# Patient Record
Sex: FEMALE | Race: WHITE | NOT HISPANIC OR LATINO | Employment: STUDENT | URBAN - METROPOLITAN AREA
[De-identification: names, ages, dates, MRNs, and addresses within clinical notes are randomized per-mention and may not be internally consistent; named-entity substitution may affect disease eponyms.]

---

## 2020-02-19 ENCOUNTER — OFFICE VISIT (OUTPATIENT)
Dept: ENDOCRINOLOGY | Facility: CLINIC | Age: 25
End: 2020-02-19

## 2020-02-19 VITALS
OXYGEN SATURATION: 99 % | SYSTOLIC BLOOD PRESSURE: 100 MMHG | WEIGHT: 119.2 LBS | HEART RATE: 76 BPM | DIASTOLIC BLOOD PRESSURE: 60 MMHG

## 2020-02-19 DIAGNOSIS — L65.9 HAIR LOSS: ICD-10-CM

## 2020-02-19 DIAGNOSIS — E03.9 ACQUIRED HYPOTHYROIDISM: Primary | ICD-10-CM

## 2020-02-19 DIAGNOSIS — E01.0 THYROMEGALY: ICD-10-CM

## 2020-02-19 PROCEDURE — 99204 OFFICE O/P NEW MOD 45 MIN: CPT | Performed by: PHYSICIAN ASSISTANT

## 2020-02-19 RX ORDER — ATENOLOL 25 MG/1
25 TABLET ORAL DAILY
COMMUNITY
End: 2020-04-28

## 2020-02-19 RX ORDER — NORETHINDRONE ACETATE AND ETHINYL ESTRADIOL, AND FERROUS FUMARATE 1MG-20(24)
KIT ORAL
COMMUNITY

## 2020-02-19 NOTE — PROGRESS NOTES
"  Chief Complaint:   Samantha Kaufman is a 24 y.o. female who is being seen today for abnormal thyroid function tests.     HPI     23 yo fairly healthy female comes in for further evaluation of abnormal thyroid function tests.   She reports first menses at age 13 but for the past year periods have been more painful and heavier and for the past 2 months has experiences spotting in between periods.   She c/o about 20# unintentional weight loss over the last year. She did experience some nausea and poor appetite last summer but that has improved. She continues to lose weight. She denies current n/v, abdominal pain, dizziness, low bp, sx of hypoglycemia.   She lived in [place] for about 5 months about 18 months ago and noticed increased hair loss and brittle hair. This has improved some but not much. She has tried different shampoos.  She c/o her scalp itches. Went to derm and was given a shampoo and \"drops\" to use but didn't help so she stopped using it. Took b vitamins but didn't notice an improvement in hair.   She also c/o chronic palpitations, anxiety.  Has acne.     Diagnosed: 1/2020  Medication: none  Previous medication: none  Hx of radiation to head/neck: no  Family hx of thyroid cancer: no    Labs reviewed:  1/9/2020 - TSH 5.13, H/H wnl, DHEA 202.4 wnl, cortisol at 2:42pm 10.7 wnl, FSH 7, prolactin 13  8/2019- TSH 2.7    The following portions of the patient's history were reviewed and updated as appropriate: allergies, current medications, past family history, past medical history, past social history, past surgical history and problem list.    Review of Systems  Review of Systems   Constitutional: Positive for unexpected weight change (weight loss).   Cardiovascular: Positive for palpitations.   Genitourinary: Positive for menstrual problem.   Skin:        Hair loss/brittle hair   All other systems reviewed and are negative.       Current medications:  Current Outpatient Medications   Medication Sig Dispense " Refill   • atenolol (TENORMIN) 25 MG tablet Take 25 mg by mouth Daily.     • Norethin Ace-Eth Estrad-FE (TAYTULLA) 1-20 MG-MCG(24) capsule Take  by mouth.       No current facility-administered medications for this visit.        Physical Exam   Vitals:    02/19/20 1430   BP: 100/60   Pulse: 76   SpO2: 99%   There is no height or weight on file to calculate BMI.  Physical Exam   Constitutional: She is oriented to person, place, and time. She appears well-developed. No distress.   HENT:   Head: Normocephalic.   Right Ear: External ear normal.   Left Ear: External ear normal.   Mouth/Throat: No oropharyngeal exudate.   Eyes: Conjunctivae and lids are normal. Right eye exhibits no discharge. Left eye exhibits no discharge. Right pupil is reactive. Left pupil is reactive.   Neck: No JVD present. No tracheal deviation present. Thyromegaly present. No thyroid mass present.   Cardiovascular: Normal rate, regular rhythm, normal heart sounds and intact distal pulses.   No murmur heard.  Pulmonary/Chest: Effort normal and breath sounds normal. No respiratory distress. She has no wheezes.   Abdominal: Soft. Bowel sounds are normal. There is no tenderness.   Musculoskeletal: She exhibits no edema or tenderness.   Lymphadenopathy:     She has no cervical adenopathy.   Neurological: She is alert and oriented to person, place, and time.   Skin: Skin is warm, dry and intact. No rash noted. She is not diaphoretic. No erythema.   Psychiatric: She has a normal mood and affect. Her speech is normal and behavior is normal. Thought content normal.       Labs and Imaging   No results found for: TSH, B2KCTZT, V1BIQTG, THYROIDAB    Assessment / Plan     Samantha was seen today for establish care.    Diagnoses and all orders for this visit:    Acquired hypothyroidism  -     TSH  -     T4, Free  -     Thyroid Antibodies  -     Vitamin D 25 Hydroxy    Hair loss  -     Ferritin    Thyromegaly  -     US Thyroid; Future        Problem List Items  Addressed This Visit        Endocrine    Acquired hypothyroidism - Primary    Relevant Medications    atenolol (TENORMIN) 25 MG tablet    Other Relevant Orders    TSH    T4, Free    Thyroid Antibodies    Vitamin D 25 Hydroxy      Other Visit Diagnoses     Hair loss        Relevant Orders    Ferritin    Thyromegaly        Relevant Medications    atenolol (TENORMIN) 25 MG tablet    Other Relevant Orders    US Thyroid        Diagnosis was discussed and reviewed with the patient including the advantages of drug therapy.        1. Patient appears clinically hypothyroid. Will repeat TFTs and add thyroid antibodies before treating. Add ferritin as she does have hair loss. Thyroid u/s for thyromegaly on exam. Her weight loss is unexplained- other than weight loss she does not exhibit other symptoms of adrenal insufficiency and a random cortisol was normal last month.       We have discussed in details the nature of the thyroid disease, thyroid hormone action, optimal TSH goals of 0.5-3.5, method of administration of levothyroxine and medication interactions.  I recommended taking the medication on an empty stomach in the morning or at bedtime, at least 30 minutes prior to intake of food or hot drinks and 4 hours apart from calcium or iron supplements.  2. Patient will return to our office in 2 months   3. The risks and benefits of my recommendations, as well as other treatment options were discussed with the patient today. Questions were answered.       Kiet Ly PA-C

## 2020-02-19 NOTE — PATIENT INSTRUCTIONS
Hypothyroidism    Hypothyroidism is when the thyroid gland does not make enough of certain hormones (it is underactive). The thyroid gland is a small gland located in the lower front part of the neck, just in front of the windpipe (trachea). This gland makes hormones that help control how the body uses food for energy (metabolism) as well as how the heart and brain function. These hormones also play a role in keeping your bones strong. When the thyroid is underactive, it produces too little of the hormones thyroxine (T4) and triiodothyronine (T3).  What are the causes?  This condition may be caused by:  · Hashimoto's disease. This is a disease in which the body's disease-fighting system (immune system) attacks the thyroid gland. This is the most common cause.  · Viral infections.  · Pregnancy.  · Certain medicines.  · Birth defects.  · Past radiation treatments to the head or neck for cancer.  · Past treatment with radioactive iodine.  · Past exposure to radiation in the environment.  · Past surgical removal of part or all of the thyroid.  · Problems with a gland in the center of the brain (pituitary gland).  · Lack of enough iodine in the diet.  What increases the risk?  You are more likely to develop this condition if:  · You are female.  · You have a family history of thyroid conditions.  · You use a medicine called lithium.  · You take medicines that affect the immune system (immunosuppressants).  What are the signs or symptoms?  Symptoms of this condition include:  · Feeling as though you have no energy (lethargy).  · Not being able to tolerate cold.  · Weight gain that is not explained by a change in diet or exercise habits.  · Lack of appetite.  · Dry skin.  · Coarse hair.  · Menstrual irregularity.  · Slowing of thought processes.  · Constipation.  · Sadness or depression.  How is this diagnosed?  This condition may be diagnosed based on:  · Your symptoms, your medical history, and a physical exam.  · Blood  tests.  You may also have imaging tests, such as an ultrasound or MRI.  How is this treated?  This condition is treated with medicine that replaces the thyroid hormones that your body does not make. After you begin treatment, it may take several weeks for symptoms to go away.  Follow these instructions at home:  · Take over-the-counter and prescription medicines only as told by your health care provider.  · If you start taking any new medicines, tell your health care provider.  · Keep all follow-up visits as told by your health care provider. This is important.  ? As your condition improves, your dosage of thyroid hormone medicine may change.  ? You will need to have blood tests regularly so that your health care provider can monitor your condition.  Contact a health care provider if:  · Your symptoms do not get better with treatment.  · You are taking thyroid replacement medicine and you:  ? Sweat a lot.  ? Have tremors.  ? Feel anxious.  ? Lose weight rapidly.  ? Cannot tolerate heat.  ? Have emotional swings.  ? Have diarrhea.  ? Feel weak.  Get help right away if you have:  · Chest pain.  · An irregular heartbeat.  · A rapid heartbeat.  · Difficulty breathing.  Summary  · Hypothyroidism is when the thyroid gland does not make enough of certain hormones (it is underactive).  · When the thyroid is underactive, it produces too little of the hormones thyroxine (T4) and triiodothyronine (T3).  · The most common cause is Hashimoto's disease, a disease in which the body's disease-fighting system (immune system) attacks the thyroid gland. The condition can also be caused by viral infections, medicine, pregnancy, or past radiation treatment to the head or neck.  · Symptoms may include weight gain, dry skin, constipation, feeling as though you do not have energy, and not being able to tolerate cold.  · This condition is treated with medicine to replace the thyroid hormones that your body does not make.  This information  is not intended to replace advice given to you by your health care provider. Make sure you discuss any questions you have with your health care provider.  Document Released: 12/18/2006 Document Revised: 11/28/2018 Document Reviewed: 11/28/2018  [employer] Interactive Patient Education © 2020 [employer]

## 2020-02-20 ENCOUNTER — TELEPHONE (OUTPATIENT)
Dept: ENDOCRINOLOGY | Facility: CLINIC | Age: 25
End: 2020-02-20

## 2020-02-20 LAB
25(OH)D3 SERPL-MCNC: 38.9 NG/ML (ref 30–100)
FERRITIN SERPL-MCNC: 19.8 NG/ML (ref 13–150)
T4 FREE SERPL-MCNC: 1.06 NG/DL (ref 0.93–1.7)
TSH SERPL DL<=0.05 MIU/L-ACNC: 4.28 UIU/ML (ref 0.27–4.2)

## 2020-02-20 PROCEDURE — 86376 MICROSOMAL ANTIBODY EACH: CPT | Performed by: PHYSICIAN ASSISTANT

## 2020-02-20 PROCEDURE — 82728 ASSAY OF FERRITIN: CPT | Performed by: PHYSICIAN ASSISTANT

## 2020-02-20 PROCEDURE — 86800 THYROGLOBULIN ANTIBODY: CPT | Performed by: PHYSICIAN ASSISTANT

## 2020-02-20 PROCEDURE — 84443 ASSAY THYROID STIM HORMONE: CPT | Performed by: PHYSICIAN ASSISTANT

## 2020-02-20 PROCEDURE — 82306 VITAMIN D 25 HYDROXY: CPT | Performed by: PHYSICIAN ASSISTANT

## 2020-02-20 PROCEDURE — 84439 ASSAY OF FREE THYROXINE: CPT | Performed by: PHYSICIAN ASSISTANT

## 2020-02-20 NOTE — TELEPHONE ENCOUNTER
Patient states that she was expecting to have an Rx sent to her pharmacy for her thyroid after her visit yesterday.     Call back [phone]

## 2020-02-20 NOTE — TELEPHONE ENCOUNTER
Left message on voicemail letting her know that Kiet wanted to wait until she got her lab results to send in the medication. We should have them back by Monday, I will call her about the results then.

## 2020-02-24 DIAGNOSIS — E03.9 ACQUIRED HYPOTHYROIDISM: Primary | ICD-10-CM

## 2020-02-24 LAB
THYROGLOB AB SERPL-ACNC: <1 IU/ML (ref 0–0.9)
THYROPEROXIDASE AB SERPL-ACNC: 8 IU/ML (ref 0–34)

## 2020-02-24 RX ORDER — LEVOTHYROXINE SODIUM 0.03 MG/1
25 TABLET ORAL DAILY
Qty: 90 TABLET | Refills: 1 | Status: SHIPPED | OUTPATIENT
Start: 2020-02-24 | End: 2020-04-28 | Stop reason: SDUPTHER

## 2020-02-25 ENCOUNTER — HOSPITAL ENCOUNTER (OUTPATIENT)
Dept: ULTRASOUND IMAGING | Facility: HOSPITAL | Age: 25
Discharge: HOME | End: 2020-02-25
Admitting: PHYSICIAN ASSISTANT

## 2020-02-25 ENCOUNTER — TELEPHONE (OUTPATIENT)
Dept: ENDOCRINOLOGY | Facility: CLINIC | Age: 25
End: 2020-02-25

## 2020-02-25 DIAGNOSIS — E01.0 THYROMEGALY: ICD-10-CM

## 2020-02-25 PROCEDURE — 76536 US EXAM OF HEAD AND NECK: CPT

## 2020-03-05 ENCOUNTER — TELEPHONE (OUTPATIENT)
Dept: ENDOCRINOLOGY | Facility: CLINIC | Age: 25
End: 2020-03-05

## 2020-03-06 NOTE — TELEPHONE ENCOUNTER
Spoke with patient, she must have gotten an old message because I had spoke with her on 2/25 and discussed lab results. We did not have her thyroid antibodies back at that time so I told her I would call her after you reviewed the results. She also asked about her U/S, I told her that you had mailed her a letter and I confirmed the address. I did read her the results and recommendations.

## 2020-04-28 ENCOUNTER — TELEMEDICINE (OUTPATIENT)
Dept: ENDOCRINOLOGY | Facility: CLINIC | Age: 25
End: 2020-04-28

## 2020-04-28 DIAGNOSIS — E03.9 ACQUIRED HYPOTHYROIDISM: ICD-10-CM

## 2020-04-28 PROCEDURE — 99213 OFFICE O/P EST LOW 20 MIN: CPT | Performed by: PHYSICIAN ASSISTANT

## 2020-04-28 RX ORDER — LEVOTHYROXINE SODIUM 0.03 MG/1
25 TABLET ORAL DAILY
Qty: 90 TABLET | Refills: 1 | Status: SHIPPED | OUTPATIENT
Start: 2020-04-28 | End: 2020-05-29 | Stop reason: SDUPTHER

## 2020-04-28 NOTE — PROGRESS NOTES
Video visit conducted to comply with patient safety concerns in accordance with CDC recommendations for the COVID-19 pandemic.       Chief Complaint:   Samantha Kaufman is a 24 y.o. female who is being seen today for hypothyroidism    HPI     23 yo fairly healthy female being seen today for f/u of her hypothyroidism.   She reports first menses at age 13 but for the past year periods have been more painful and heavier and for the past 2 months has experiences spotting in between periods.   Since starting on levothyroxine she reports a significant improvement in her hair, significantly less hair loss and the hair is not dry anymore.  Weight has been stable since last visit. Appetite is good.   Since being on birth control periods are regular but she does have some spotting in between periods.   She is back home in [place] and likely staying there indefinitely.     Periods are weird. More regular with some spotting in between.   Weight stable at this point.   Hair better, not falling out as much and not as dry.     Diagnosed: 1/2020  Medication: levothyroxine 25 mcg daily  Previous medication: none  Hx of radiation to head/neck: no  Family hx of thyroid cancer: no    The following portions of the patient's history were reviewed and updated by me as appropriate: allergies, current medications, past family history, past social history, past surgical history and problem list.      Review of Systems  Review of Systems   All other systems reviewed and are negative.       Current medications:  Current Outpatient Medications   Medication Sig Dispense Refill   • levothyroxine (Synthroid) 25 MCG tablet Take 1 tablet by mouth Daily. 90 tablet 1   • Norethin Ace-Eth Estrad-FE (TAYTULLA) 1-20 MG-MCG(24) capsule Take  by mouth.       No current facility-administered medications for this visit.        Physical Exam   There were no vitals filed for this visit.There is no height or weight on file to calculate BMI.  Physical Exam    Constitutional: She appears well-developed and well-nourished. No distress.   AAOx3   HENT:   Head: Normocephalic and atraumatic.   Right Ear: External ear normal.   Left Ear: External ear normal.   Skin: She is not diaphoretic. No erythema. No pallor.   Psychiatric: She has a normal mood and affect. Her behavior is normal. Judgment and thought content normal.   Limited exam due video/phone visit      Labs and Imaging   Lab Results   Component Value Date    TSH 4.280 (H) 02/20/2020    THYROIDAB 8 02/20/2020       Thyroid u/s 2/25/2020- results reviewed, small nodules bilaterally 5mm or less in max diameter, these nodules are not suspicious     Assessment / Plan     Samantha was seen today for follow-up.    Diagnoses and all orders for this visit:    Acquired hypothyroidism  -     levothyroxine (Synthroid) 25 MCG tablet; Take 1 tablet by mouth Daily.  -     TSH; Future  -     T4, Free; Future        Problem List Items Addressed This Visit        Endocrine    Acquired hypothyroidism    Relevant Medications    levothyroxine (Synthroid) 25 MCG tablet    Other Relevant Orders    TSH    T4, Free        Diagnosis was discussed and reviewed with the patient including the advantages of drug therapy.        1. Patient appears clinically euthyroid. Mailing lab orders to repeat TFTs. Discussed thyroid u/s results with pt- she has multiple small thyroid nodules 5mm or less, none suspicious. U/s could be repeated in a year for stability. Since she is back home in [place] I recommended establishing with a new endo provider or seeing PCP for further management of hypothyroidism.       We have discussed in details the nature of the thyroid disease, thyroid hormone action, optimal TSH goals of 0.5-3.5, method of administration of levothyroxine and medication interactions.  I recommended taking the medication on an empty stomach in the morning or at bedtime, at least 30 minutes prior to intake of food or hot drinks and 4 hours apart  from calcium or iron supplements.  2. F/u PRN  3. The risks and benefits of my recommendations, as well as other treatment options were discussed with the patient today. Questions were answered.     Total time of discussion was 10 minutes.      Kiet Ly PA-C

## 2020-05-29 DIAGNOSIS — E03.9 ACQUIRED HYPOTHYROIDISM: ICD-10-CM

## 2020-05-29 RX ORDER — LEVOTHYROXINE SODIUM 0.05 MG/1
50 TABLET ORAL DAILY
Qty: 90 TABLET | Refills: 0 | Status: SHIPPED | OUTPATIENT
Start: 2020-05-29

## 2020-06-04 ENCOUNTER — TELEPHONE (OUTPATIENT)
Dept: ENDOCRINOLOGY | Facility: CLINIC | Age: 25
End: 2020-06-04